# Patient Record
Sex: FEMALE | Race: WHITE
[De-identification: names, ages, dates, MRNs, and addresses within clinical notes are randomized per-mention and may not be internally consistent; named-entity substitution may affect disease eponyms.]

---

## 2018-03-30 ENCOUNTER — HOSPITAL ENCOUNTER (INPATIENT)
Dept: HOSPITAL 92 - ERS | Age: 58
LOS: 5 days | Discharge: HOME | DRG: 62 | End: 2018-04-04
Attending: INTERNAL MEDICINE | Admitting: INTERNAL MEDICINE
Payer: SELF-PAY

## 2018-03-30 VITALS — BODY MASS INDEX: 48.4 KG/M2

## 2018-03-30 LAB
ALBUMIN SERPL BCG-MCNC: 4.2 G/DL (ref 3.5–5)
ALP SERPL-CCNC: 84 U/L (ref 40–150)
ALT SERPL W P-5'-P-CCNC: 21 U/L (ref 8–55)
ANION GAP SERPL CALC-SCNC: 13 MMOL/L (ref 10–20)
APTT PPP: 27.8 SEC (ref 22.9–36.1)
AST SERPL-CCNC: 19 U/L (ref 5–34)
BASOPHILS # BLD AUTO: 0.1 THOU/UL (ref 0–0.2)
BASOPHILS NFR BLD AUTO: 1.5 % (ref 0–1)
BILIRUB SERPL-MCNC: 0.3 MG/DL (ref 0.2–1.2)
BUN SERPL-MCNC: 25 MG/DL (ref 9.8–20.1)
CALCIUM SERPL-MCNC: 9.8 MG/DL (ref 7.8–10.44)
CHLORIDE SERPL-SCNC: 100 MMOL/L (ref 98–107)
CK MB SERPL-MCNC: 3.4 NG/ML (ref 0–6.6)
CO2 SERPL-SCNC: 28 MMOL/L (ref 22–29)
CREAT CL PREDICTED SERPL C-G-VRATE: 0 ML/MIN (ref 70–130)
EOSINOPHIL # BLD AUTO: 0.2 THOU/UL (ref 0–0.7)
EOSINOPHIL NFR BLD AUTO: 3.2 % (ref 0–10)
GLOBULIN SER CALC-MCNC: 3.3 G/DL (ref 2.4–3.5)
GLUCOSE SERPL-MCNC: 154 MG/DL (ref 70–105)
HGB BLD-MCNC: 14 G/DL (ref 12–16)
INR PPP: 1
LYMPHOCYTES # BLD: 1.1 THOU/UL (ref 1.2–3.4)
LYMPHOCYTES NFR BLD AUTO: 21 % (ref 21–51)
MCH RBC QN AUTO: 27.7 PG (ref 27–31)
MCV RBC AUTO: 86.2 FL (ref 81–99)
MONOCYTES # BLD AUTO: 0.3 THOU/UL (ref 0.11–0.59)
MONOCYTES NFR BLD AUTO: 5.1 % (ref 0–10)
NEUTROPHILS # BLD AUTO: 3.6 THOU/UL (ref 1.4–6.5)
NEUTROPHILS NFR BLD AUTO: 69.2 % (ref 42–75)
PLATELET # BLD AUTO: 300 THOU/UL (ref 130–400)
POTASSIUM SERPL-SCNC: 4.5 MMOL/L (ref 3.5–5.1)
PROTHROMBIN TIME: 13.5 SEC (ref 12–14.7)
RBC # BLD AUTO: 5.07 MILL/UL (ref 4.2–5.4)
SODIUM SERPL-SCNC: 136 MMOL/L (ref 136–145)
SP GR UR STRIP: 1.03 (ref 1–1.04)
TROPONIN I SERPL DL<=0.01 NG/ML-MCNC: (no result) NG/ML (ref ?–0.03)
TROPONIN I SERPL DL<=0.01 NG/ML-MCNC: (no result) NG/ML (ref ?–0.03)
WBC # BLD AUTO: 5.1 THOU/UL (ref 4.8–10.8)

## 2018-03-30 PROCEDURE — 82553 CREATINE MB FRACTION: CPT

## 2018-03-30 PROCEDURE — 80061 LIPID PANEL: CPT

## 2018-03-30 PROCEDURE — 83036 HEMOGLOBIN GLYCOSYLATED A1C: CPT

## 2018-03-30 PROCEDURE — 93306 TTE W/DOPPLER COMPLETE: CPT

## 2018-03-30 PROCEDURE — 85730 THROMBOPLASTIN TIME PARTIAL: CPT

## 2018-03-30 PROCEDURE — 70450 CT HEAD/BRAIN W/O DYE: CPT

## 2018-03-30 PROCEDURE — 85610 PROTHROMBIN TIME: CPT

## 2018-03-30 PROCEDURE — 83735 ASSAY OF MAGNESIUM: CPT

## 2018-03-30 PROCEDURE — 36416 COLLJ CAPILLARY BLOOD SPEC: CPT

## 2018-03-30 PROCEDURE — 36415 COLL VENOUS BLD VENIPUNCTURE: CPT

## 2018-03-30 PROCEDURE — 0042T: CPT

## 2018-03-30 PROCEDURE — 3E03317 INTRODUCTION OF OTHER THROMBOLYTIC INTO PERIPHERAL VEIN, PERCUTANEOUS APPROACH: ICD-10-PCS | Performed by: EMERGENCY MEDICINE

## 2018-03-30 PROCEDURE — 70498 CT ANGIOGRAPHY NECK: CPT

## 2018-03-30 PROCEDURE — 93005 ELECTROCARDIOGRAM TRACING: CPT

## 2018-03-30 PROCEDURE — A4216 STERILE WATER/SALINE, 10 ML: HCPCS

## 2018-03-30 PROCEDURE — 85025 COMPLETE CBC W/AUTO DIFF WBC: CPT

## 2018-03-30 PROCEDURE — 81003 URINALYSIS AUTO W/O SCOPE: CPT

## 2018-03-30 PROCEDURE — 84484 ASSAY OF TROPONIN QUANT: CPT

## 2018-03-30 PROCEDURE — 80048 BASIC METABOLIC PNL TOTAL CA: CPT

## 2018-03-30 PROCEDURE — 93312 ECHO TRANSESOPHAGEAL: CPT

## 2018-03-30 PROCEDURE — 70496 CT ANGIOGRAPHY HEAD: CPT

## 2018-03-30 PROCEDURE — 94760 N-INVAS EAR/PLS OXIMETRY 1: CPT

## 2018-03-30 PROCEDURE — 80053 COMPREHEN METABOLIC PANEL: CPT

## 2018-03-30 NOTE — CT
CTA [place] OF [place] WITH 3D VOLUME RENDERING 

CTA NECK WITH 3D VOLUME RENDERING

CT BRAIN PERFUSION WITH CONTRAST:

 

Date:  03/30/18 

 

TECHNIQUE: 

CTA brain with contrast, CTA neck with contrast, and CT brain perfusion with contrast performed per s
[facility] protocol. 

 

CLINICAL HISTORY:  

Acute stroke, slurred speech. 

 

FINDINGS:

There is vascular calcification involving the imaged aortic arch. The great vessel origins that emana
te from the aortic arch are grossly patent. No significant stenosis of either visualized subclavian a
rtery. Bilateral common carotid arteries reveal no high grade stenosis or occlusion. There is a media
lly deviate retropharyngeal course of each carotid artery. There is calcified plaque at the origin of
 the cervical right ICA with mild associated luminal stenosis. Minimal calcification without associat
ed stenosis seen at the left carotid bulb. Otherwise, the cervical ICA are patent. There is calcifica
tion at each carotid terminus, involving intracranial course. Evaluation of [place] of [place] reveals 
no high grade stenosis or occlusion of the MCA, RONNY, or PCA bilaterally. No obvious abnormality of th
e anterior or posterior communicating arteries. No discrete intracranial aneurysm is seen. 

 

Evaluation of CT brain perfusion reveals an area of encephalomalacia in the right frontal lobe. There
 is no significant penumbra identified. 

 

Incidental note of prominent sized mediastinal lymph nodes, redemonstrated. 

 

IMPRESSION: 

1.  Mild scattered vascular disease. 

2.  Right frontal encephalomalacia. 

3.  No large penumbra. 

 

Notification of report results placed at 1038 hours on 03/30/18.

CODE CR. 

 

 

 

POS: [facility]

## 2018-03-30 NOTE — CT
CT HEAD NONCONTRAST:

 

Date:  03/30/18 

 

CLINICAL HISTORY:  

Stroke, slurred speech. 

 

FINDINGS:

There is no intracranial hemorrhage, mass effect, midline shift, or ventriculomegaly. There is eviden
ce to indicate mild microvascular ischemic disease, and a small remote right frontal lobe infarct. Th
ere is a nonspecific mild increased density seen within the right MCA. 

 

IMPRESSION: 

1.  No acute intracranial hemorrhage or mass effect. 

2.  Mild, relative increased density of the right MCA, nonspecific. 

 

Telephone call of stroke results placed to the provided ED telephone number, [phone] (KELLY So), in [facility] emergency department at 1008 hours on 03/30/18. 

 

CODE CR. 

 

 

POS: [facility]

## 2018-03-31 LAB
ANION GAP SERPL CALC-SCNC: 8 MMOL/L (ref 10–20)
APTT PPP: 28.2 SEC (ref 22.9–36.1)
BASOPHILS # BLD AUTO: 0.1 THOU/UL (ref 0–0.2)
BASOPHILS NFR BLD AUTO: 1.1 % (ref 0–1)
BUN SERPL-MCNC: 20 MG/DL (ref 9.8–20.1)
CALCIUM SERPL-MCNC: 9.5 MG/DL (ref 7.8–10.44)
CHD RISK SERPL-RTO: 4.6 (ref ?–4.5)
CHLORIDE SERPL-SCNC: 101 MMOL/L (ref 98–107)
CHOLEST SERPL-MCNC: 143 MG/DL
CO2 SERPL-SCNC: 32 MMOL/L (ref 22–29)
CREAT CL PREDICTED SERPL C-G-VRATE: 101 ML/MIN (ref 70–130)
EOSINOPHIL # BLD AUTO: 0.2 THOU/UL (ref 0–0.7)
EOSINOPHIL NFR BLD AUTO: 4.1 % (ref 0–10)
GLUCOSE SERPL-MCNC: 119 MG/DL (ref 70–105)
HDLC SERPL-MCNC: 31 MG/DL
HGB BLD-MCNC: 13 G/DL (ref 12–16)
INR PPP: 1.1
LDLC SERPL CALC-MCNC: 71 MG/DL
LYMPHOCYTES # BLD: 1.5 THOU/UL (ref 1.2–3.4)
LYMPHOCYTES NFR BLD AUTO: 27.5 % (ref 21–51)
MCH RBC QN AUTO: 28 PG (ref 27–31)
MCV RBC AUTO: 87.7 FL (ref 81–99)
MONOCYTES # BLD AUTO: 0.6 THOU/UL (ref 0.11–0.59)
MONOCYTES NFR BLD AUTO: 11 % (ref 0–10)
NEUTROPHILS # BLD AUTO: 3.1 THOU/UL (ref 1.4–6.5)
NEUTROPHILS NFR BLD AUTO: 56.3 % (ref 42–75)
PLATELET # BLD AUTO: 297 THOU/UL (ref 130–400)
POTASSIUM SERPL-SCNC: 4 MMOL/L (ref 3.5–5.1)
PROTHROMBIN TIME: 14.2 SEC (ref 12–14.7)
RBC # BLD AUTO: 4.64 MILL/UL (ref 4.2–5.4)
SODIUM SERPL-SCNC: 137 MMOL/L (ref 136–145)
TRIGL SERPL-MCNC: 207 MG/DL (ref ?–150)
WBC # BLD AUTO: 5.5 THOU/UL (ref 4.8–10.8)

## 2018-03-31 RX ADMIN — INSULIN HUMAN SCH UNIT: 100 INJECTION, SUSPENSION SUBCUTANEOUS at 20:31

## 2018-03-31 RX ADMIN — INSULIN HUMAN SCH UNIT: 100 INJECTION, SUSPENSION SUBCUTANEOUS at 07:52

## 2018-03-31 RX ADMIN — BISOPROLOL FUMARATE AND HYDROCHLOROTHIAZIDE SCH TAB: 6.25; 5 TABLET ORAL at 07:51

## 2018-03-31 NOTE — CT
PRELIMINARY REPORT/[facility]/EMERGENCY AFTER

HOURS PROCEDURE:

 

EXAM:

CT Head Without Intravenous Contrast

 

CLINICAL HISTORY:

58 years old, female; Signs and symptoms; Other: S/P tpa; Patient HX: F/u stroke; S/P tpa on 3/30, HX
 of stroke; Pt shows decreased facial droop of the left side; Pt. No longer has slurred speech.

 

TECHNIQUE:

Axial computed tomography images of the head/brain without intravenous contrast.

 

COMPARISON:

CT Brain WO Con 2018-03-30 10:02

 

FINDINGS:

Study is degraded by motion, streak and beam hardening artifacts.

Subtle small 5 mm hyperdensity in the inferior aspect of the robbie on the left seen in image 6.

New hypodensity in the medial aspect of the right putamen junction with the posterior limb of the int
ernal capsule.

No midline shift, mass, mass effect or hydrocephalus.

Gray-white matter differentiation is preserved.

Interval resolution of the previously seen mild increased right MCA density.

Orbits are unremarkable.

Opacification of the left frontal sinus and left anterior ethmoid sinus.

Mastoid air cells are clear.

No acute fracture.

Soft tissues unremarkable.

 

IMPRESSION:

1. Subtle small 5 mm hyperdensity in the inferior aspect of the robbie on the left seen in image 6. Thi
s is favored to represent artifact. However, recommend short interval followup imaging for further ev
aluation.

2. New hypodensity in the medial aspect of the right putamen junction with the posterior limb of the 
internal capsule consistent with acute infarct given the clinical history.

3. Interval resolution of the previously seen mild increased right MCA density.

 

THIS REPORT CONTAINS FINDINGS THAT MAY BE CRITICAL TO PATIENT CARE. The findings were verbally commun
icated via telephone conference with BERE KNUTSON at 5:25 AM CDT on 3/31/2018. The findings were ack
nowledged and understood.

 

Thank you for allowing us to participate in the care of your patient.

 

Dictated and Authenticated by: Yoel Soler MD

03/31/2018 5:27 AM Central Time ([place] & [place])

 

 

 

FINAL REPORT 

 

CT BRAIN WITHOUT CONTRAST:

 

HISTORY: 

Stroke.

 

COMPARISON: 

CT brain 3/30/18.

 

FINDINGS: 

There is hypodensity along the right centrum semiovale extending to the posterior limb right internal
 capsule.  This is new.  No acute hemorrhage.

 

IMPRESSION: 

Findings and impression are concordant with the preliminary report.

 

POS: [facility]

## 2018-03-31 NOTE — CON
DATE OF CONSULTATION:  2018

 

SERVICE:  Pulmonary Medicine.

 

REASON FOR CONSULTATION:  ICU patient.

 

HISTORY OF PRESENT ILLNESS:  The patient is a 58-year-old white female with 
past medical history significant for morbid obesity, type 2 diabetes mellitus 
and history of left-sided neurologic symptoms.  She presented to the hospital 
shortly after the onset of a left-sided weakness.  In the Emergency Department, 
she was administered TPA.  She was subsequently tucked into the ICU.  By the 
time she got here, her NIH scale was 3.  Overnight, she did not have any 
events.  She still indicates that she has a difficult time with her speech and 
that it feels funny.  Outside of this, her strength in the left lower extremity 
and left upper extremity has improved.

 

PAST MEDICAL HISTORY:

1.  Type 2 diabetes mellitus.

2.  Hypertension.

3.  Dyslipidemia.

4.  Morbid obesity.

5.  Neuropathy secondary to diabetes.

6.  Chronic low back pain.

7.  Obstructive sleep apnea.

8.  Anxiety disorder.

9.  Major depressive disorder.

 

PAST SURGICAL HISTORY:

1.   section x2.

2.  Breast biopsy.

3.  Bilateral shoulder surgeries.

 

SOCIAL HISTORY:  The patient is .  She denies any alcohol, tobacco or 
illicit drug use.  She has no exposure to chemicals, dusts, asbestos or 
tuberculosis.

 

FAMILY HISTORY:  Noncontributory.

 

ALLERGIES:  No known drug allergies.

 

MEDICATIONS:  List of her inpatient medications were reviewed.  Thera are no 
specific updates made at this time.

 

REVIEW OF SYSTEMS:  General, head, ears, eyes, nose, throat, cardiovascular, 
respiratory, GI, , musculoskeletal, neurologic and skin is negative except as 
mentioned in the HPI.

 

PHYSICAL EXAMINATION:

VITAL SIGNS:  Afebrile, blood pressure 154/67, respirations 17, saturation 99% 
on room air.

GENERAL:  The patient is awake, alert, no apparent distress.

LUNGS:  Decent air entry with no prolonged expiratory phase, wheezing, rhonchi, 
or crackles present.

HEART:  Normal rate, regular.

ABDOMEN:  Soft, nontender, nondistended.  Bowel sounds are positive.

MUSCULOSKELETAL:  No cyanosis or clubbing.  No pitting in the bilateral lower 
extremities.

NEUROLOGIC:  Grossly nonfocal.  She has left-sided facial droop, but I get the 
idea that this is functional.

 

LABORATORY DATA:  WBC 5.5, hemoglobin 13.0, platelets 297,000.  INR 1.1.  
Creatinine 1.2.  Basic metabolic profile and liver function studies are 
otherwise unremarkable.  Troponin 0.01.  Triglycerides are slightly elevated at 
207.  Hemoglobin A1c 7.1.  Urinalysis is unremarkable.

 

IMAGIN.  CT of the brain demonstrates a subtle small 5 mm hyperdensity in the 
inferior aspect of the robbie.  It is favored to be an artifact, however.  New 
hypodensity in the medial aspect of the right putamen junction with the 
posterior limb of the internal capsule, consistent with acute infarction.

2.  CTA of the head demonstrates mild scattered vascular disease.  Right 
frontal encephalomalacia.  No large penumbra is identified.

 

ASSESSMENT:

1.  Cerebrovascular accident, acute, status post TPA.

2.  Type 2 diabetes mellitus.

3.  Morbid obesity.

4.  Obstructive sleep apnea,

 

DISCUSSION AND PLAN:  The patient will continue using her CPAP equipment.  She 
is stable for transition out of the ICU to the Neuro unit as soon as 24 hours 
has passed since administration of the TPA.  I believe this will be scheduled 
for about at noon today.  When she goes to the floor, she had no further 
requirements for Pulmonary opinion and I will sign off.  Please call with 
additional questions or concerns moving forward.

 

70 minutes have been devoted to this patient in various activities.  I 
personally reviewed all imaging studies and laboratory data noted within this 
document.  For fifty percent of this time, I was interacting with the patient 
at the bedside or coordinating care with the care team.  For the remainder of 
the time I was immediately available to the patient in the hospital unit.  



MEENU

## 2018-03-31 NOTE — CON
DATE OF CONSULTATION:  2018

 

REFERRING PROVIDER:  Dr. Arsen Abernathy.

 

REASON FOR CONSULTATION:  Left-sided weakness.

 

HISTORY OF PRESENT ILLNESS:  Ms. Guevara is a pleasant 58-year-old  female who has been consul
[facility] for evaluation of left-sided weakness.  The patient reports that she has a history of stroke abou
t a year ago which resulted in left-sided weakness.  She had fully recovered from the stroke.  She wa
s independent.  She was able to walk without any assistance, had no problems with her speech or swall
owing or vision.  She reports that yesterday she had a sudden onset of left-sided weakness.  She had 
difficulty with talking.  She also noted left facial droop, she noted heaviness in the left upper and
 lower extremity and numbness in the left upper and lower extremity.  She immediately called EMS and 
she was brought to the [place] Emergency Room.  On arrival here, she had a CT head without contras
t and CT angiogram of the head and neck which were unremarkable.  She also had a CT perfusion scan do
ne which was unremarkable.  The ER physician had called regarding this patient to discuss the case re
garding IV TPA.  At that time given that she had no contraindication for IV TPA and her stroke scale 
was more than 4, I decided to administer the IV TPA to her.  Today, she reports of improvement in her
 strength of left upper and left lower extremity.  She continues to feel somewhat change in hearing o
r speech.  She also continues to complain of having left facial droop.  She denies any headache, ches
t pain, palpitation, nausea, vomiting and abdominal pain.

 

PAST MEDICAL HISTORY:  Significant for hypertension, diabetes, dyslipidemia, morbid obesity, neuropat
hy secondary to diabetes, obstructive sleep apnea, anxiety disorder, major depressive disorder and hi
story of stroke.

 

PAST SURGICAL HISTORY:  Significant for  x2, breast biopsy and bilateral shoulder surgeries.


 

SOCIAL HISTORY:  She is .  She denies smoking, alcohol use, or illicit drug use.

 

FAMILY HISTORY:  Noncontributory.

 

CURRENT MEDICATIONS:  Please review MAR.

 

ALLERGIES:  No known drug allergies.

 

PHYSICAL EXAMINATION:

VITAL SIGNS:  Blood pressure of 118/46, pulse of 60, temperature of 97.7, respirations of 20, and O2 
sats 94% on room air.

GENERAL:  Well-developed and well-nourished  female in no apparent distress.

RESPIRATORY:  Clear to auscultation bilaterally.

CARDIOVASCULAR:  Regular rate and rhythm.

NEUROLOGIC:  NIH stroke scale on my evaluation is noted to be 1.  Mental status:  The patient is awak
e, alert, oriented x3.  Speech and language:  Fluent speech, no dysarthria noted.  Cranial nerves:  P
upils are 3 mm and reactive.  Visual fields are intact.  External muscles are intact.  No nystagmus i
s noted.  There is a left facial droop noted.  Tongue and uvula are midline.  Motor exam showed [place]
l tone and bulk with 5/5 strength in both upper and lower extremities.  There is no pronator drift no
gustavo in both upper and lower extremities.  Sensory:  Sensation is intact and symmetric.  Deep tendon r
eflexes:  1+ reflexes in both upper and lower extremities.  Babinski:  Plantar responses flexion bila
terally.  Coordination intact to finger-nose-finger finger tapping bilaterally.

 

LABORATORY DATA:  Reviewed, which included CBC, CMP, lipid profile, urinalysis, coag panel, which is 
significant for hemoglobin A1c of 7.1 with glucose of 186, total cholesterol 143, ALT of 71, HDL of 3
1, and triglycerides of 207, otherwise unremarkable.

 

IMAGING STUDIES:  CT head without contrast was reviewed which showed no acute intracranial abnormalit
y.  Repeat CT head from today was reviewed which showed hypodensity along the right centrum semiovale
 extending into the right posterior limb of right internal capsule.  This is new, less suggestive of 
acute infarction.  CT angiogram of the head and neck and CT perfusion scan were reviewed.  This showe
d no acute intracranial or extracranial vascular abnormality.

 

IMPRESSION:

1.  Acute right middle cerebral artery distribution ischemic infarct.

2.  Left-sided weakness converted to #1.

3.  Hypertension.

4.  Diabetes.

 

ASSESSMENT AND PLAN:  Ms. Guevara is a pleasant 58-year-old  female with a history of hyperten
boy, diabetes, and dyslipidemia who presented with an acute onset of left-sided weakness.  She had m
et the criteria for IV TPA.  There was no contraindication noted.  Due to this reason, she had receiv
ed IV TPA.  Post-TPA, her symptoms seem to have improved.  Her repeat CT head done today did show an 
acute ischemic infarct involving the right centrum semiovale extending into the posterior limb of int
ernal capsule.  At this time, I will recommend switching her aspirin to Plavix 75 mg daily for second
[place] stroke prevention.  I have advised her that she needs to control her blood pressure, diabetes, an
d cholesterol and diet and exercise.  She will need PT, OT, and Speech Therapy evaluation.  She is ok
ay to be transferred to the Stroke Unit.  Continue supportive care.

 

Thank you for consultation.

## 2018-03-31 NOTE — PDOC.PN
- Subjective


Encounter Start Date: 03/31/18


Encounter Start Time: 14:00





Pt still with a little dysarthria, but LLE paresis resolved, LUE weakness 

resolved.  right facial droop resolved.





no f/c, no N/V/d/C, no CP or sOB,ernestina po solids and liquids without coughing.





10 point ROS performed and neg for all systems except as per HPI





- Objective


MAR Reviewed: Yes


Vital Signs & Weight: 


 Vital Signs (12 hours)











  Temp Pulse Pulse BP BP Pulse Ox Pulse Ox


 


 03/31/18 20:00  98 F      


 


 03/31/18 16:00  97.7 F      


 


 03/31/18 12:00  97.7 F      


 


 03/31/18 09:20   61  69  137/69  159/67 H  95  95








 Weight











Weight                         268 lb 11.896 oz











 Most Recent Monitor Data











Heart Rate from ECG            70


 


NIBP                           151/53


 


NIBP BP-Mean                   90


 


Respiration from ECG           20


 


SpO2                           95














I&O: 


 











 03/30/18 03/31/18 04/01/18





 06:59 06:59 06:59


 


Intake Total  1220 1370


 


Output Total  2300 2100


 


Balance  -1080 -730











Result Diagrams: 


 03/31/18 04:30





 03/31/18 04:30


Additional Labs: 


 Accuchecks











  03/31/18 03/31/18 03/30/18





  17:40 11:07 21:20


 


POC Glucose  130 H  102  186 H











Radiology Reviewed by me: Yes


EKG Reviewed by me: Yes





Phys Exam





- Physical Examination


Constitutional: NAD


HEENT: PERRLA, moist MMs, sclera anicteric, oral pharynx no lesions


Neck: no nodes, no JVD, supple, full ROM


Respiratory: no wheezing, no rales, no rhonchi, clear to auscultation bilateral


Cardiovascular: RRR, no significant murmur, no rub


Gastrointestinal: soft, non-tender, no distention, positive bowel sounds


Musculoskeletal: no edema, pulses present


Neurological: non-focal, normal sensation, moves all 4 limbs


Lymphatic: no nodes


Psychiatric: normal affect, A&O x 3


Skin: no rash, normal turgor, cap refill <2 seconds





Dx/Plan


(1) Acute kidney injury


Code(s): N17.9 - ACUTE KIDNEY FAILURE, UNSPECIFIED   Status: Acute   





(2) CVA (cerebral vascular accident)


Code(s): I63.9 - CEREBRAL INFARCTION, UNSPECIFIED   Status: Acute   


Qualifiers: 


   CVA mechanism: thrombosis   Precerebral and cerebral artery: middle cerebral 

artery   Laterality of affected vessel: right   Qualified Code(s): I63.311 - 

Cerebral infarction due to thrombosis of right middle cerebral artery   





(3) Diabetes type 2, controlled


Code(s): E11.9 - TYPE 2 DIABETES MELLITUS WITHOUT COMPLICATIONS   Status: 

Chronic   


Qualifiers: 


   Diabetes mellitus long term insulin use: with long term use   Diabetes 

mellitus complication detail: with polyneuropathy 





(4) Diabetic neuropathy


Code(s): E11.40 - TYPE 2 DIABETES MELLITUS WITH DIABETIC NEUROPATHY, UNSP   

Status: Chronic   


Qualifiers: 


   Diabetes mellitus type: type 2   Diabetes mellitus complication detail: 

diabetic polyneuropathy   Qualified Code(s): E11.42 - Type 2 diabetes mellitus 

with diabetic polyneuropathy   





(5) Dyslipidemia


Code(s): E78.5 - HYPERLIPIDEMIA, UNSPECIFIED   Status: Chronic   





(6) Hypertension


Code(s): I10 - ESSENTIAL (PRIMARY) HYPERTENSION   Status: Chronic   


Qualifiers: 


   Hypertension type: essential hypertension   Qualified Code(s): I10 - 

Essential (primary) hypertension   





(7) Hypothyroidism


Code(s): E03.9 - HYPOTHYROIDISM, UNSPECIFIED   Status: Chronic   


Qualifiers: 


   Hypothyroidism type: acquired   Qualified Code(s): E03.9 - Hypothyroidism, 

unspecified   





(8) Morbid obesity with BMI of 45.0-49.9, adult


Code(s): E66.01 - MORBID (SEVERE) OBESITY DUE TO EXCESS CALORIES; Z68.42 - BODY 

MASS INDEX (BMI) 45.0-49.9, ADULT   Status: Chronic   





(9) KEVIN (obstructive sleep apnea)


Code(s): G47.33 - OBSTRUCTIVE SLEEP APNEA (ADULT) (PEDIATRIC)   Status: Chronic

   





- Plan


cont current plan of care, PT/OT, speech therapy, out of bed/ambulate





* .


hold ASA and heparin, CT with 'new' hyperdnese pontine lesion.  Will ask NSG to 

see, repeat CT ordered for the AM





OK to transfer to stroke unit, orders written

## 2018-04-01 RX ADMIN — INSULIN HUMAN SCH UNIT: 100 INJECTION, SUSPENSION SUBCUTANEOUS at 21:23

## 2018-04-01 RX ADMIN — INSULIN HUMAN SCH UNIT: 100 INJECTION, SUSPENSION SUBCUTANEOUS at 08:45

## 2018-04-01 RX ADMIN — INSULIN HUMAN SCH: 100 INJECTION, SUSPENSION SUBCUTANEOUS at 21:32

## 2018-04-01 RX ADMIN — BISOPROLOL FUMARATE AND HYDROCHLOROTHIAZIDE SCH TAB: 6.25; 5 TABLET ORAL at 08:43

## 2018-04-01 NOTE — CT
PRELIMINARY REPORT/[facility]/EMERGENTY AFTER-HOURS PROCEDURE 

 

EXAM:

CT Head Without Intravenous Contrast

 

CLINICAL HISTORY:

58 years old, female; Signs and symptoms; Weakness, facial; Patient HX: F/u stroke on 03/30/2018; Pt.
 Shows decreased facial droop of the left side and no longer has slurred speech. Tpa given on 03/30. 
F/u possible pontine bleed.

 

TECHNIQUE:

Axial computed tomography images of the head/brain without intravenous contrast.

 

COMPARISON:

CT Brain WO Con 2018-03-31 04:49

 

FINDINGS:

Mild cerebral volume loss.

Chronic small vessel disease.

Unchanged small hypodensity in the posterior limb of the internal capsule on the right.

No intracranial hemorrhage or hydrocephalus.

No mass, mass effect or midline shift.

No effacement of the ventricles, cortical sulci and basal cisterns.

Gray-white matter differentiation is preserved.

Atherosclerosis of the intracranial vasculature.

No dense MCA sign.

Orbits are unremarkable.

Ossification of the left frontal and left anterior ethmoid sinus.

Mastoid air cells are clear.

No acute fracture.

Soft tissues unremarkable.

 

IMPRESSION:

1. No intracranial hemorrhage.

2. Unchanged small hypodensity in the posterior limb of the internal capsule on the right consistent

with recent infarct.

 

Thank you for allowing us to participate in the care of your patient.

Dictated and Authenticated by: Yoel Soler MD

04/01/2018 6:08 AM Central Time ([place] & [place])

 

 

 

FINAL REPORT 

 

CT BRAIN WITHOUT CONTRAST:

 

HISTORY: 

Followup pontine hemorrhage.

 

COMPARISON: 

CT brain prior day.

 

FINDINGS: 

Findings and impression are concordant with the preliminary report.

 

POS: [facility]

## 2018-04-01 NOTE — PRG
DATE OF SERVICE:  04/01/2018

 

SERVICE:  Pulmonary Medicine.

 

INTERVAL HISTORY:  The patient is doing fantastic from a respiratory perspective.  She denies any mark
st pain, nausea, vomiting or shortness of breath.  She went down for repeat CT scan of the head.  Oth
erwise, there has been no interval change to her condition.

 

PHYSICAL EXAMINATION:

VITAL SIGNS:  Afebrile, pulse 66, blood pressure 148/67, respirations 58, saturation 95% on room air.


GENERAL:  The patient is awake and alert, in no apparent distress.

HEENT:  Normocephalic, atraumatic.  Sclerae are white, conjunctivae pink.  Oral and nasal mucosa is m
oist without lesions.  There is slight facial asymmetry.

LUNGS:  Decent air entry with no prolonged expiratory phase.

HEART:  Normal rate, regular.

ABDOMEN:  Soft, nontender, nondistended.  Bowel sounds are positive.

MUSCULOSKELETAL:  No cyanosis or clubbing.  No pitting in the bilateral lower extremities.

NEUROLOGIC:  Slight facial asymmetry.  Outside of this, she is nonfocal.

 

LABORATORY DATA AND IMAGING:  Blood sugars ranged from .  Urinalysis is unremarkable.  The conor
ent remains stable for transition out of the ICU to the stroke unit.  No interval change on CT scan t
salv[place].

 

ASSESSMENT:

1.  Cerebrovascular accident, status post TPA.

2.  Type 2 diabetes mellitus.

3.  Morbid obesity.

4.  Obstructive sleep apnea.

 

DISCUSSION AND PLAN:  The patient is doing fantastic neurologically.  As such, she can be transitione
d to the stroke unit.  Pulmonary and Critical Care will sign off when she lands on the floor.  Please
 call with additional questions or concerns moving forward.

## 2018-04-01 NOTE — PDOC.PN
- Subjective


Encounter Start Date: 04/01/18


Encounter Start Time: 10:45





Pt stable.  still with mild dysarthria, but no problems with eating or 

drinking.  walking with PT well,  left arm and leg and right face back to 

normal.





Seen by Neuro, recommended changing ASAto Plavix.  Changes made.  Awaiting bed 

on the floor.





Echo with good function, but MV with poor images, recommended GORDON.  Will 

consult cardiology in AM and keep NPO after MN





10 point ROS performed and neg for all systems except as per HPI





- Objective


MAR Reviewed: Yes


Vital Signs & Weight: 


 Vital Signs (12 hours)











  Temp Pulse Pulse Pulse Resp BP BP


 


 04/01/18 12:00  97.4 F L      


 


 04/01/18 10:44    98  98    170/78 H


 


 04/01/18 08:42   66     148/67 H 


 


 04/01/18 08:00  98.5 F  66    20  


 


 04/01/18 07:00  98.5 F      


 


 04/01/18 04:00  97.9 F      














  BP Pulse Ox


 


 04/01/18 12:00  


 


 04/01/18 10:44  158/88 H 


 


 04/01/18 08:42  


 


 04/01/18 08:00   95


 


 04/01/18 07:00  


 


 04/01/18 04:00  








 Weight











Weight                         268 lb 11.896 oz











 Most Recent Monitor Data











Heart Rate from ECG            62


 


NIBP                           153/81


 


NIBP BP-Mean                   97


 


Respiration from ECG           14


 


SpO2                           88














I&O: 


 











 03/31/18 04/01/18 04/02/18





 06:59 06:59 06:59


 


Intake Total 1220 4050 600


 


Output Total 2300 2600 875


 


Balance -1080 1450 -275











Result Diagrams: 


 03/31/18 04:30





 03/31/18 04:30


Additional Labs: 


 Accuchecks











  04/01/18 04/01/18 04/01/18





  11:11 04:57 02:14


 


POC Glucose  132 H  117 H  86














  04/01/18 03/31/18 03/31/18





  01:38 20:38 17:40


 


POC Glucose  57 L*  177 H  130 H











Radiology Reviewed by me: Yes


EKG Reviewed by me: Yes





Phys Exam





- Physical Examination


Constitutional: NAD


HEENT: PERRLA, moist MMs, sclera anicteric, oral pharynx no lesions


Neck: no nodes, no JVD, supple, full ROM


Respiratory: no wheezing, no rales, no rhonchi, clear to auscultation bilateral


Cardiovascular: RRR, no significant murmur, no rub


Gastrointestinal: soft, non-tender, no distention, positive bowel sounds


Musculoskeletal: no edema, pulses present


Neurological: non-focal, normal sensation, moves all 4 limbs


Lymphatic: no nodes


Psychiatric: normal affect, A&O x 3


Skin: no rash, normal turgor, cap refill <2 seconds





Dx/Plan


(1) Acute kidney injury


Code(s): N17.9 - ACUTE KIDNEY FAILURE, UNSPECIFIED   Status: Acute   





(2) CVA (cerebral vascular accident)


Code(s): I63.9 - CEREBRAL INFARCTION, UNSPECIFIED   Status: Acute   


Qualifiers: 


   CVA mechanism: thrombosis   Precerebral and cerebral artery: middle cerebral 

artery   Laterality of affected vessel: right   Qualified Code(s): I63.311 - 

Cerebral infarction due to thrombosis of right middle cerebral artery   


Comment: residual dysarthria, small putamen infarct.  Plavix   





(3) Diabetes type 2, controlled


Code(s): E11.9 - TYPE 2 DIABETES MELLITUS WITHOUT COMPLICATIONS   Status: 

Chronic   


Qualifiers: 


   Diabetes mellitus long term insulin use: with long term use   Diabetes 

mellitus complication detail: with polyneuropathy 





(4) Diabetic neuropathy


Code(s): E11.40 - TYPE 2 DIABETES MELLITUS WITH DIABETIC NEUROPATHY, UNSP   

Status: Chronic   


Qualifiers: 


   Diabetes mellitus type: type 2   Diabetes mellitus complication detail: 

diabetic polyneuropathy   Qualified Code(s): E11.42 - Type 2 diabetes mellitus 

with diabetic polyneuropathy   





(5) Dyslipidemia


Code(s): E78.5 - HYPERLIPIDEMIA, UNSPECIFIED   Status: Chronic   





(6) Hypertension


Code(s): I10 - ESSENTIAL (PRIMARY) HYPERTENSION   Status: Chronic   


Qualifiers: 


   Hypertension type: essential hypertension   Qualified Code(s): I10 - 

Essential (primary) hypertension   





(7) Hypothyroidism


Code(s): E03.9 - HYPOTHYROIDISM, UNSPECIFIED   Status: Chronic   


Qualifiers: 


   Hypothyroidism type: acquired   Qualified Code(s): E03.9 - Hypothyroidism, 

unspecified   





(8) Morbid obesity with BMI of 45.0-49.9, adult


Code(s): E66.01 - MORBID (SEVERE) OBESITY DUE TO EXCESS CALORIES; Z68.42 - BODY 

MASS INDEX (BMI) 45.0-49.9, ADULT   Status: Chronic   





(9) KEVIN (obstructive sleep apnea)


Code(s): G47.33 - OBSTRUCTIVE SLEEP APNEA (ADULT) (PEDIATRIC)   Status: Chronic

   





- Plan


cont current plan of care, plan discussed w/ family, PT/OT, out of bed/ambulate





* .


GORDON in AM

## 2018-04-02 LAB
ANION GAP SERPL CALC-SCNC: 13 MMOL/L (ref 10–20)
BASOPHILS # BLD AUTO: 0.1 THOU/UL (ref 0–0.2)
BASOPHILS NFR BLD AUTO: 1.1 % (ref 0–1)
BUN SERPL-MCNC: 22 MG/DL (ref 9.8–20.1)
CALCIUM SERPL-MCNC: 9.8 MG/DL (ref 7.8–10.44)
CHLORIDE SERPL-SCNC: 101 MMOL/L (ref 98–107)
CO2 SERPL-SCNC: 25 MMOL/L (ref 22–29)
CREAT CL PREDICTED SERPL C-G-VRATE: 98 ML/MIN (ref 70–130)
EOSINOPHIL # BLD AUTO: 0.3 THOU/UL (ref 0–0.7)
EOSINOPHIL NFR BLD AUTO: 4.8 % (ref 0–10)
GLUCOSE SERPL-MCNC: 154 MG/DL (ref 70–105)
HGB BLD-MCNC: 13.6 G/DL (ref 12–16)
LYMPHOCYTES # BLD: 1.7 THOU/UL (ref 1.2–3.4)
LYMPHOCYTES NFR BLD AUTO: 25.9 % (ref 21–51)
MAGNESIUM SERPL-MCNC: 1.6 MG/DL (ref 1.6–2.6)
MCH RBC QN AUTO: 27.9 PG (ref 27–31)
MCV RBC AUTO: 87.3 FL (ref 81–99)
MONOCYTES # BLD AUTO: 0.6 THOU/UL (ref 0.11–0.59)
MONOCYTES NFR BLD AUTO: 9.2 % (ref 0–10)
NEUTROPHILS # BLD AUTO: 3.8 THOU/UL (ref 1.4–6.5)
NEUTROPHILS NFR BLD AUTO: 59 % (ref 42–75)
PLATELET # BLD AUTO: 327 THOU/UL (ref 130–400)
POTASSIUM SERPL-SCNC: 4.4 MMOL/L (ref 3.5–5.1)
RBC # BLD AUTO: 4.89 MILL/UL (ref 4.2–5.4)
SODIUM SERPL-SCNC: 135 MMOL/L (ref 136–145)
WBC # BLD AUTO: 6.4 THOU/UL (ref 4.8–10.8)

## 2018-04-02 RX ADMIN — Medication SCH ML: at 22:14

## 2018-04-02 RX ADMIN — INSULIN HUMAN SCH UNIT: 100 INJECTION, SUSPENSION SUBCUTANEOUS at 09:02

## 2018-04-02 RX ADMIN — INSULIN HUMAN SCH: 100 INJECTION, SUSPENSION SUBCUTANEOUS at 22:09

## 2018-04-02 RX ADMIN — BISOPROLOL FUMARATE AND HYDROCHLOROTHIAZIDE SCH TAB: 6.25; 5 TABLET ORAL at 08:47

## 2018-04-02 NOTE — PDOC.PN
- Subjective


Encounter Start Date: 04/02/18


Encounter Start Time: 10:40


Subjective: Speech still a little slurred. No other symptoms. Doing well.





- Objective


MAR Reviewed: Yes


Vital Signs & Weight: 


 Vital Signs (12 hours)











  Temp Pulse Resp BP Pulse Ox


 


 04/02/18 08:47   67   


 


 04/02/18 07:38  97.6 F  67  16  150/76 H  91 L


 


 04/02/18 05:45  98.4 F  60  16  147/73 H  92 L


 


 04/02/18 04:05  98.4 F  64  16  157/86 H  94 L


 


 04/02/18 00:00  98.6 F  63  20  142/62 H  95








 Weight











Weight                         263 lb 3.2 oz











 Most Recent Monitor Data











Heart Rate from ECG            66


 


NIBP                           168/86


 


NIBP BP-Mean                   104


 


Respiration from ECG           12


 


SpO2                           88














I&O: 


 











 04/01/18 04/02/18 04/03/18





 06:59 06:59 06:59


 


Intake Total 4050 2150 


 


Output Total 2600 875 


 


Balance 1450 1275 











Result Diagrams: 


 04/02/18 04:52





 04/02/18 04:52


Additional Labs: 


 Accuchecks











  04/02/18 04/01/18 04/01/18





  05:49 21:02 17:38


 


POC Glucose  152 H  206 H  109














  04/01/18





  11:11


 


POC Glucose  132 H














Phys Exam





- Physical Examination


Constitutional: NAD


HEENT: moist MMs


Respiratory: no wheezing, no rales, no rhonchi, clear to auscultation bilateral


Cardiovascular: RRR, no significant murmur


Gastrointestinal: soft, positive bowel sounds


Musculoskeletal: no edema


Neurological: non-focal, moves all 4 limbs


Psychiatric: normal affect, A&O x 3





Dx/Plan


(1) CVA (cerebral vascular accident)


Code(s): I63.9 - CEREBRAL INFARCTION, UNSPECIFIED   Status: Acute   


Qualifiers: 


   CVA mechanism: thrombosis   Precerebral and cerebral artery: middle cerebral 

artery   Laterality of affected vessel: right   Qualified Code(s): I63.311 - 

Cerebral infarction due to thrombosis of right middle cerebral artery   


Comment: residual dysarthria, small putamen infarct, s/p TPA, start Plavix when 

recommended by Neuro.   





(2) Acute kidney injury


Code(s): N17.9 - ACUTE KIDNEY FAILURE, UNSPECIFIED   Status: Acute   Comment: 

stable   





(3) Chronic low back pain


Code(s): M54.5 - LOW BACK PAIN; G89.29 - OTHER CHRONIC PAIN   Status: Chronic   





(4) Diabetes type 2, controlled


Code(s): E11.9 - TYPE 2 DIABETES MELLITUS WITHOUT COMPLICATIONS   Status: 

Chronic   


Qualifiers: 


   Diabetes mellitus long term insulin use: with long term use   Diabetes 

mellitus complication detail: with polyneuropathy 





(5) Diabetic neuropathy


Code(s): E11.40 - TYPE 2 DIABETES MELLITUS WITH DIABETIC NEUROPATHY, UNSP   

Status: Chronic   


Qualifiers: 


   Diabetes mellitus type: type 2   Diabetes mellitus complication detail: 

diabetic polyneuropathy   Qualified Code(s): E11.42 - Type 2 diabetes mellitus 

with diabetic polyneuropathy   





(6) Dyslipidemia


Code(s): E78.5 - HYPERLIPIDEMIA, UNSPECIFIED   Status: Chronic   





(7) Hypertension


Code(s): I10 - ESSENTIAL (PRIMARY) HYPERTENSION   Status: Chronic   


Qualifiers: 


   Hypertension type: essential hypertension   Qualified Code(s): I10 - 

Essential (primary) hypertension   





(8) Hypothyroidism


Code(s): E03.9 - HYPOTHYROIDISM, UNSPECIFIED   Status: Chronic   


Qualifiers: 


   Hypothyroidism type: acquired   Qualified Code(s): E03.9 - Hypothyroidism, 

unspecified   





(9) Morbid obesity with BMI of 45.0-49.9, adult


Code(s): E66.01 - MORBID (SEVERE) OBESITY DUE TO EXCESS CALORIES; Z68.42 - BODY 

MASS INDEX (BMI) 45.0-49.9, ADULT   Status: Chronic   





(10) KEVIN (obstructive sleep apnea)


Code(s): G47.33 - OBSTRUCTIVE SLEEP APNEA (ADULT) (PEDIATRIC)   Status: Chronic

   





- Plan


cont current plan of care, PT/OT, out of bed/ambulate, DVT proph w/SCDs


Plan for GORDON by cardiology today or tomorrow.





* .








- Discharge Day


Encounter end time: 11:00

## 2018-04-02 NOTE — HP
DATE OF ADMISSION:  2018

 

PRIMARY CARE PHYSICIAN:  [facility].

 

CHIEF COMPLAINT:  Slurred speech.

 

HISTORY OF PRESENT ILLNESS:  Ms. Guevara is a 58-year-old female with history of severe obesity, diabet
es, hypothyroidism, hypertension, obstructive sleep apnea, stroke secondary to cerebrovascular diseas
e back in 2017 and depression, who presents to the emergency department.

 

She developed difficulty forming speech and felt off balance when she woke up at about 0800 on the da
y of admission.  These symptoms were present when wake up.  She was walking around her house and noti
earlene how difficult it was to get around and so her  brought her to the emergency department for
 evaluation.

 

In the emergency department, she had a clear right-sided facial droop and weakness to the left upper 
and left lower extremity that was coming and going.  They contacted Dr. Brittni Sy, who agreed with T
PA and the patient was given intravenous tissue plasminogen activator.  We were subsequently called a
fter administration for admission to the ICU under TPA protocol.

 

The patient was seen and examined.  She was forming speech and was able to move her left leg markedly
 improved on arrival to the ICU.  She had improved strength through her left upper extremity.

 

She denies any fevers or chills.  No nausea or vomiting.  No diarrhea or constipation.  No difficulty
 swallowing or choking.

 

PAST MEDICAL HISTORY:

1.  Diabetes mellitus type 2.

2.  Hypothyroidism.

3.  Hypertension.

4.  Obstructive sleep apnea, on an auto CPAP at 3.5 cm of water.

5.  Ovarian cyst.

6.  History of stroke in 2017.

7.  Depression.

 

PAST SURGICAL HISTORY:

1.   x2.

2.  Breast biopsy.

3.  Bilateral shoulder repair.

 

HOME MEDICATIONS:

1.  Amlodipine 20 mg p.o. daily.

2.  Aspirin 81 mg daily.

3.  Bisoprolol/hydrochlorothiazide 5/6.25 two tablets p.o. daily.

4.  Fluoxetine 60 mg p.o. daily.

5.  Neurontin 600 mg p.o. b.i.d.

6.  NovoLog 70/30 of 43 units subcu b.i.d.

7.  Levothyroxine 137 mcg daily.

8.  Losartan 100 mg daily.

9.  Metformin 1000 mg p.o. b.i.d.

10.  Naproxen 220 mg p.o. b.i.d. p.r.n.

 

ALLERGIES:  NKDA.

 

FAMILY HISTORY:  Negative history of clotting or bleeding disorder, no immune dysfunction.  No signif
icant cerebrovascular disease.

 

SOCIAL HISTORY:  Negative for habits x3.  She is .  Her  does accompany her.  No recent
 travel.

 

REVIEW OF SYSTEMS:  A 10-point review of systems was performed and was negative for all systems excep
t stated as per HPI.

 

PHYSICAL EXAMINATION:

VITAL SIGNS:  Temperature is 98.3, pulse 57, blood pressure 148/65, respiratory rate 21, satting 100%
 on room air.

GENERAL:  She is awake.  She is alert.  She is oriented x3.  She is a pleasant, severely obese white 
female, appears to be in no acute distress.  Her speech is a concise and mostly clear.  She has occas
ionally garbled sound, but has not having trouble finding words and has a normal speech pattern.

HEENT:  Normocephalic, atraumatic.  Pupils are equal and reactive bilaterally.  Mucous membranes are 
moist.  She has no visible lesions.  No thrush.  Face is symmetric.

NECK:  Supple.  She has no lymphadenopathy, JVD or thyromegaly.  She has normal carotid upstrokes.  T
here are no bruits.

LUNGS:  Clear.  No wheezes, no rales, no rhonchi.

CARDIOVASCULAR:  She is slightly bradycardic, but regular.  Normal S1, S2.  She has a faint diastolic
 rumble present over the apex.  I do not hear a holosystolic murmur.

ABDOMEN:  Obese.  It is nontender, nondistended.  I cannot palpate internal organs.

EXTREMITIES:  Show no cyanosis, no clubbing, and no edema.  She has 2+ peripheral pulses in the dorsa
lis pedis, posterior tibial and radial arteries bilaterally.

SKIN:  Warm, moist, and well perfused.  She has no rashes or lesions.

NEUROLOGIC:  Cranial nerves II-XII are grossly intact.  She has no facial asymmetry.  She does look l
helio she has a little bit of left lower lip droop, but does have a symmetric smile.  She has no asymme
try to her forehead.  She has normal speech pattern, 5/5 strength in all 4 of her extremities.  She h
as no coordination issues.  I did not get her a walker.

MUSCULOSKELETAL:  Normal to inspection.  Large joints appear uninflamed and normal to inspection.  Sh
e has no palpable effusions.

 

LABORATORY DATA:  Sodium is 136, potassium 4.5, chloride 100, bicarbonate 28, BUN 28, creatinine 1.26
, glucose 154 and calcium 9.8.

 

Liver function is completely within normal limits.

 

CBC showed a white count of 5.1, hemoglobin 14.0, hematocrit of 43.7, platelet count is 306,000.

 

CK-MB was normal at 3.4, troponin I was less than 0.010.  Urinalysis was negative.  INR is 1.0.

 

Last echo on 04/15/2017 showed EF of 60% to 65%.  She had moderate to severe left atrial enlargement 
and moderate to severe mitral stenosis, possibly rheumatic mitral valve.

 

RADIOGRAPHIC STUDIES:  She had a CT scan of the brain that showed right MCA microvascular disease.  N
o evidence of intracranial hemorrhage or mass effect.  CT angiogram with perfusion scan showed mild s
cattered vascular disease, right frontal encephalomalacia and no large penumbra.

 

ASSESSMENT AND PLAN:

1.  Cerebrovascular accident.  The patient got TPA, but on arrival to the ICU, was almost completely 
resolved.  She continues to have a small amount of dysarthria.  On the TPA protocol, we will hold all
 antiplatelet agents.  We will get a repeat CT scan in the morning.  We will continue neuro checks an
d ask Neurology to evaluate.  PT, OT and ST has been consulted.  We will not make her n.p.o. as she i
s not having trouble.  We will allow her to eat overnight.  If she developed any new problems, then w
e will alter the plan obviously.

2.  Diabetes mellitus type 2.  Since she is eating, we will continue regular medications with sliding
 scale insulin added.

3.  Hypothyroidism, on levothyroxine.  We will continue.

4.  Hypertension.  We will continue bisoprolol and hydrochlorothiazide, continue her losartan, and co
ntinue her amlodipine.

5.  History of cerebrovascular disease.  She is already on aspirin daily.  We will increase to 325 wh
en she is able to take antiplatelet agents.  We will follow up on Neurology's recommendations.

6.  Depression, on no equivalent therapy.

## 2018-04-03 PROCEDURE — B24BZZ4 ULTRASONOGRAPHY OF HEART WITH AORTA, TRANSESOPHAGEAL: ICD-10-PCS | Performed by: INTERNAL MEDICINE

## 2018-04-03 RX ADMIN — INSULIN HUMAN SCH UNIT: 100 INJECTION, SUSPENSION SUBCUTANEOUS at 21:19

## 2018-04-03 RX ADMIN — Medication SCH ML: at 21:22

## 2018-04-03 RX ADMIN — Medication SCH: at 09:12

## 2018-04-03 RX ADMIN — BISOPROLOL FUMARATE AND HYDROCHLOROTHIAZIDE SCH TAB: 6.25; 5 TABLET ORAL at 05:48

## 2018-04-03 RX ADMIN — INSULIN HUMAN SCH: 100 INJECTION, SUSPENSION SUBCUTANEOUS at 09:12

## 2018-04-03 NOTE — PDOC.PN
- Subjective


Encounter Start Date: 04/03/18


Encounter Start Time: 11:15


Subjective: Patient feels like the slurred speech is a bit better. Awaiting


-: GORDON.





- Objective


MAR Reviewed: Yes


Vital Signs & Weight: 


 Vital Signs (12 hours)











  Temp Pulse Resp BP Pulse Ox


 


 04/03/18 07:30  97.7 F  57 L  16  146/77 H  97


 


 04/03/18 03:50  97.6 F  58 L  16  142/78 H  94 L


 


 04/03/18 00:04  96.6 F L  57 L  16  146/90 H  93 L








 Weight











Weight                         263 lb 4.8 oz











 Most Recent Monitor Data











Heart Rate from ECG            66


 


NIBP                           168/86


 


NIBP BP-Mean                   104


 


Respiration from ECG           12


 


SpO2                           88














I&O: 


 











 04/02/18 04/03/18 04/04/18





 06:59 06:59 06:59


 


Intake Total 2150 1320 


 


Output Total 875  


 


Balance 1275 1320 











Result Diagrams: 


 04/02/18 04:52





 04/02/18 04:52


Additional Labs: 


 Accuchecks











  04/03/18 04/02/18 04/02/18





  06:15 20:53 17:21


 


POC Glucose  118 H  180 H  178 H














  04/02/18





  10:45


 


POC Glucose  219 H














Phys Exam





- Physical Examination


Constitutional: NAD


HEENT: moist MMs


Respiratory: no wheezing, no rales, no rhonchi, clear to auscultation bilateral


Cardiovascular: RRR, no significant murmur


Gastrointestinal: soft, positive bowel sounds


Musculoskeletal: no edema


Neurological: non-focal, moves all 4 limbs


Psychiatric: normal affect, A&O x 3





Dx/Plan


(1) CVA (cerebral vascular accident)


Code(s): I63.9 - CEREBRAL INFARCTION, UNSPECIFIED   Status: Acute   


Qualifiers: 


   CVA mechanism: thrombosis   Precerebral and cerebral artery: middle cerebral 

artery   Laterality of affected vessel: right   Qualified Code(s): I63.311 - 

Cerebral infarction due to thrombosis of right middle cerebral artery   


Comment: residual dysarthria, small putamen infarct, s/p TPA, Plavix started, 

will try to get for patient outpatient   





(2) Acute kidney injury


Code(s): N17.9 - ACUTE KIDNEY FAILURE, UNSPECIFIED   Status: Acute   Comment: 

stable   





(3) Chronic low back pain


Code(s): M54.5 - LOW BACK PAIN; G89.29 - OTHER CHRONIC PAIN   Status: Chronic   





(4) Diabetes type 2, controlled


Code(s): E11.9 - TYPE 2 DIABETES MELLITUS WITHOUT COMPLICATIONS   Status: 

Chronic   


Qualifiers: 


   Diabetes mellitus long term insulin use: with long term use   Diabetes 

mellitus complication detail: with polyneuropathy 





(5) Diabetic neuropathy


Code(s): E11.40 - TYPE 2 DIABETES MELLITUS WITH DIABETIC NEUROPATHY, UNSP   

Status: Chronic   


Qualifiers: 


   Diabetes mellitus type: type 2   Diabetes mellitus complication detail: 

diabetic polyneuropathy   Qualified Code(s): E11.42 - Type 2 diabetes mellitus 

with diabetic polyneuropathy   





(6) Dyslipidemia


Code(s): E78.5 - HYPERLIPIDEMIA, UNSPECIFIED   Status: Chronic   





(7) Hypertension


Code(s): I10 - ESSENTIAL (PRIMARY) HYPERTENSION   Status: Chronic   


Qualifiers: 


   Hypertension type: essential hypertension   Qualified Code(s): I10 - 

Essential (primary) hypertension   





(8) Hypothyroidism


Code(s): E03.9 - HYPOTHYROIDISM, UNSPECIFIED   Status: Chronic   


Qualifiers: 


   Hypothyroidism type: acquired   Qualified Code(s): E03.9 - Hypothyroidism, 

unspecified   





(9) Morbid obesity with BMI of 45.0-49.9, adult


Code(s): E66.01 - MORBID (SEVERE) OBESITY DUE TO EXCESS CALORIES; Z68.42 - BODY 

MASS INDEX (BMI) 45.0-49.9, ADULT   Status: Chronic   





(10) KEVIN (obstructive sleep apnea)


Code(s): G47.33 - OBSTRUCTIVE SLEEP APNEA (ADULT) (PEDIATRIC)   Status: Chronic

   





- Plan


cont current plan of care, PT/OT


GORDON today, if ok can d/c home, try to get Plavix for home, if unable


-: due to no insurance will send on ASA 325mg daily and have Health


-: [facility] try to arrange the Plavix as outpatient.





* .








- Discharge Day


Encounter end time: 11:25

## 2018-04-03 NOTE — ECHO
TRANSESOPHAGEAL ECHOCARDIOGRAM:

 

DATE OF PROCEDURE:  04/03/18 

 

INDICATION:

This is a 58-year-old woman with a CVA. 

 

DESCRIPTION OF PROCEDURE:

The patient was taken to the PACU. The patient was sedated by

anesthesiology. A transesophageal probe was placed in the distal

esophagus and stomach. Echocardiographic images were obtained. A contrast bubble exam was performed. 
The transesophageal probe was removed.

 

FINDINGS:

1.  Mild left atrial enlargement.

2.  The mitral valve leaflets are thickened with a mild reduction in leaflet excursion.

3.  Mild mitral stenosis.

4.  Mild tricuspid regurgitation.

5.  No thrombus in left atrial appendage.

6.  No PFO by color Doppler or contrast bubble exam.

7.  Atherosclerotic debris in the descending aorta.  

 

IMPRESSION: 

No formed thrombus in left atrial appendage. No PFO was noted

## 2018-04-04 VITALS — TEMPERATURE: 97.6 F | SYSTOLIC BLOOD PRESSURE: 119 MMHG | DIASTOLIC BLOOD PRESSURE: 70 MMHG

## 2018-04-04 LAB
ANION GAP SERPL CALC-SCNC: 14 MMOL/L (ref 10–20)
BASOPHILS # BLD AUTO: 0.1 THOU/UL (ref 0–0.2)
BASOPHILS NFR BLD AUTO: 1.3 % (ref 0–1)
BUN SERPL-MCNC: 20 MG/DL (ref 9.8–20.1)
CALCIUM SERPL-MCNC: 10.1 MG/DL (ref 7.8–10.44)
CHLORIDE SERPL-SCNC: 101 MMOL/L (ref 98–107)
CO2 SERPL-SCNC: 25 MMOL/L (ref 22–29)
CREAT CL PREDICTED SERPL C-G-VRATE: 112 ML/MIN (ref 70–130)
EOSINOPHIL # BLD AUTO: 0.5 THOU/UL (ref 0–0.7)
EOSINOPHIL NFR BLD AUTO: 8 % (ref 0–10)
GLUCOSE SERPL-MCNC: 69 MG/DL (ref 70–105)
HGB BLD-MCNC: 14.9 G/DL (ref 12–16)
LYMPHOCYTES # BLD: 1.6 THOU/UL (ref 1.2–3.4)
LYMPHOCYTES NFR BLD AUTO: 25.9 % (ref 21–51)
MCH RBC QN AUTO: 27.5 PG (ref 27–31)
MCV RBC AUTO: 84.9 FL (ref 81–99)
MONOCYTES # BLD AUTO: 0.5 THOU/UL (ref 0.11–0.59)
MONOCYTES NFR BLD AUTO: 8.2 % (ref 0–10)
NEUTROPHILS # BLD AUTO: 3.6 THOU/UL (ref 1.4–6.5)
NEUTROPHILS NFR BLD AUTO: 56.6 % (ref 42–75)
PLATELET # BLD AUTO: 330 THOU/UL (ref 130–400)
POTASSIUM SERPL-SCNC: 4 MMOL/L (ref 3.5–5.1)
RBC # BLD AUTO: 5.41 MILL/UL (ref 4.2–5.4)
SODIUM SERPL-SCNC: 136 MMOL/L (ref 136–145)
WBC # BLD AUTO: 6.3 THOU/UL (ref 4.8–10.8)

## 2018-04-04 RX ADMIN — BISOPROLOL FUMARATE AND HYDROCHLOROTHIAZIDE SCH TAB: 6.25; 5 TABLET ORAL at 10:05

## 2018-04-04 RX ADMIN — Medication SCH: at 10:07

## 2018-04-04 RX ADMIN — INSULIN HUMAN SCH: 100 INJECTION, SUSPENSION SUBCUTANEOUS at 10:07

## 2018-04-04 NOTE — DIS
DATE OF ADMISSION:  04/01/2018

 

DATE OF DISCHARGE:  04/04/2018

 

ADMITTING DIAGNOSIS:  Transient ischemic attack.

 

DISCHARGE DIAGNOSIS:  Transient ischemic attack.

 

INVESTIGATIONS DURING THIS ADMISSION:

1.  MRI of the brain.

2.  CTA.

 

CONSULTANT INVOLVED IN THE CARE:  Neurology.

 

HOSPITAL COURSE:  This is a 58-year-old morbidly obese white female with known history of hypertensio
n who presented with left-sided weakness with history of stroke about a year ago resulting in the patric
e left-sided weakness.  She has fully recovered from the stroke and she was independent.  She reports
 that she had a sudden onset of left-sided weakness and with difficulty with talking also about the l
eft facial droop.  She immediately called EMS and she was brought to the ER.  On arrival, she had a C
T head without contrast and CT angiogram of the head and neck was unremarkable.  She also had CT perf
usion scan.  So the ER physician called Neurology and discussed the case regarding TPA at this time. 
 The patient was given TPA and she reports improvements in her strength of the left upper and left lo
wer extremity immediately.  She continues to have some problems in her speech and speech therapy was 
consulted.  Patient's LDL was less than 70, so no statins were prescribed on this patient.

 

The patient had good physical therapy and occupational therapy evaluation and patient was allowed to 
go home and her symptoms were completely back to normal.

 

The patient was monitored for a couple of days because of the TPA.

 

PHYSICAL EXAMINATION:

VITAL SIGNS:  On the day of discharge, blood pressure is 119/70, heart rate is 56, respiratory rate o
f 18, saturation 98%.

GENERAL:  The patient is moderately built, moderately nourished, does not appear to be in acute distr
ess.

CARDIOVASCULAR:  S1, S2 normal.  No murmurs, rubs, gallops.

LUNGS:  Bilateral air entry was equal.  No wheezing, no crackles.

ABDOMEN:  Soft, nontender, no guarding, no rebound tenderness.  Bowel sounds normal.

MUSCULOSKELETAL:  No calf tenderness.  No pedal edema.  No joint tenderness, no joint swelling.

SKIN:  No cyanosis or edema.  No rash.

CNS:  Examination II-XII intact.  No focal deficits are noted at this time.

 

DISCHARGE MEDICATIONS:  Amlodipine 10 mg tablet 2 tablets p.o. daily, bisoprolol 2 tablets p.o. daily
, fluoxetine 60 mg p.o. daily, gabapentin 600 mg p.o. b.i.d., insulin, levothyroxine 137 mcg p.o. inocencio
ly, losartan 100 mg p.o. daily, metformin 1 tablet p.o. b.i.d.  The patient is not on statin because 
her LDL is less than 70 and was at goal.

 

The patient was advised to take Plavix instead of aspirin.  So Plavix 75 mg p.o. daily was prescribed
 on the day of discharge.

 

DISCHARGE INSTRUCTIONS:

1.  Continue Accutane.  I strongly advised to follow up with Cardiology.  The patient had a normal TE
E during this admission and we will follow with Cardiology for any further evaluation.

2.  Patient will follow up with Neurology in 2-3 weeks.

3.  The patient was advised to return back to the ER for any worsening symptoms or weakness.

 

I spent 35 minutes.

## 2018-04-07 NOTE — EKG
Test Reason : 

Blood Pressure : ***/*** mmHG

Vent. Rate : 056 BPM     Atrial Rate : 056 BPM

   P-R Int : 256 ms          QRS Dur : 142 ms

    QT Int : 504 ms       P-R-T Axes : 041 270 012 degrees

   QTc Int : 486 ms

 

Sinus bradycardia with 1st degree A-V block

Right bundle branch block

Abnormal ECG

 

Confirmed by JUAN JOSE SIMON ([id number]),  ADRIANA ROA ([id number]) on 4/7/2018 8:41:12 PM

 

Referred By:             Confirmed By:JUAN JOSE SIMON

## 2018-04-09 ENCOUNTER — HOSPITAL ENCOUNTER (EMERGENCY)
Dept: HOSPITAL 92 - ERS | Age: 58
Discharge: HOME | End: 2018-04-09
Payer: SELF-PAY

## 2018-04-09 DIAGNOSIS — N83.202: Primary | ICD-10-CM

## 2018-04-09 DIAGNOSIS — Z79.84: ICD-10-CM

## 2018-04-09 DIAGNOSIS — F17.210: ICD-10-CM

## 2018-04-09 DIAGNOSIS — Z79.82: ICD-10-CM

## 2018-04-09 DIAGNOSIS — G47.30: ICD-10-CM

## 2018-04-09 DIAGNOSIS — E11.9: ICD-10-CM

## 2018-04-09 DIAGNOSIS — I10: ICD-10-CM

## 2018-04-09 DIAGNOSIS — Z86.73: ICD-10-CM

## 2018-04-09 DIAGNOSIS — Z79.899: ICD-10-CM

## 2018-04-09 DIAGNOSIS — E03.9: ICD-10-CM

## 2018-04-09 LAB
ALBUMIN SERPL BCG-MCNC: 4.2 G/DL (ref 3.5–5)
ALP SERPL-CCNC: 82 U/L (ref 40–150)
ALT SERPL W P-5'-P-CCNC: 17 U/L (ref 8–55)
ANION GAP SERPL CALC-SCNC: 16 MMOL/L (ref 10–20)
AST SERPL-CCNC: 18 U/L (ref 5–34)
BASOPHILS # BLD AUTO: 0.1 THOU/UL (ref 0–0.2)
BASOPHILS NFR BLD AUTO: 1.4 % (ref 0–1)
BILIRUB SERPL-MCNC: 0.4 MG/DL (ref 0.2–1.2)
BUN SERPL-MCNC: 19 MG/DL (ref 9.8–20.1)
CALCIUM SERPL-MCNC: 9.7 MG/DL (ref 7.8–10.44)
CHLORIDE SERPL-SCNC: 100 MMOL/L (ref 98–107)
CO2 SERPL-SCNC: 24 MMOL/L (ref 22–29)
CREAT CL PREDICTED SERPL C-G-VRATE: 0 ML/MIN (ref 70–130)
EOSINOPHIL # BLD AUTO: 0.2 THOU/UL (ref 0–0.7)
EOSINOPHIL NFR BLD AUTO: 3.9 % (ref 0–10)
GLOBULIN SER CALC-MCNC: 3.3 G/DL (ref 2.4–3.5)
GLUCOSE SERPL-MCNC: 220 MG/DL (ref 70–105)
HGB BLD-MCNC: 14.4 G/DL (ref 12–16)
LIPASE SERPL-CCNC: 23 U/L (ref 8–78)
LYMPHOCYTES # BLD: 1.2 THOU/UL (ref 1.2–3.4)
LYMPHOCYTES NFR BLD AUTO: 19.7 % (ref 21–51)
MCH RBC QN AUTO: 26.8 PG (ref 27–31)
MCV RBC AUTO: 85.3 FL (ref 81–99)
MONOCYTES # BLD AUTO: 0.4 THOU/UL (ref 0.11–0.59)
MONOCYTES NFR BLD AUTO: 7.2 % (ref 0–10)
NEUTROPHILS # BLD AUTO: 4.1 THOU/UL (ref 1.4–6.5)
NEUTROPHILS NFR BLD AUTO: 68 % (ref 42–75)
PLATELET # BLD AUTO: 296 THOU/UL (ref 130–400)
POTASSIUM SERPL-SCNC: 4.5 MMOL/L (ref 3.5–5.1)
RBC # BLD AUTO: 5.37 MILL/UL (ref 4.2–5.4)
SODIUM SERPL-SCNC: 135 MMOL/L (ref 136–145)
WBC # BLD AUTO: 6.1 THOU/UL (ref 4.8–10.8)

## 2018-04-09 PROCEDURE — 93005 ELECTROCARDIOGRAM TRACING: CPT

## 2018-04-09 PROCEDURE — 74177 CT ABD & PELVIS W/CONTRAST: CPT

## 2018-04-09 PROCEDURE — 80053 COMPREHEN METABOLIC PANEL: CPT

## 2018-04-09 PROCEDURE — 96374 THER/PROPH/DIAG INJ IV PUSH: CPT

## 2018-04-09 PROCEDURE — 85025 COMPLETE CBC W/AUTO DIFF WBC: CPT

## 2018-04-09 PROCEDURE — 96375 TX/PRO/DX INJ NEW DRUG ADDON: CPT

## 2018-04-09 PROCEDURE — 83690 ASSAY OF LIPASE: CPT

## 2018-04-09 PROCEDURE — 96361 HYDRATE IV INFUSION ADD-ON: CPT

## 2018-04-09 NOTE — CT
CT ABDOMEN AND PELVIS WITH CONTRAST:

 

Date:  04/09/18 

 

COMPARISON:  

02/08/17. 

 

HISTORY:  

Sharp left lower quadrant abdominal pain that started 5-6 days ago and is worse with movement. 

 

TECHNIQUE:  

Multiple contiguous axial images were obtained in a CT of the abdomen and pelvis with contrast. Coron
al reformats were performed. 

 

FINDINGS:

There is questionable hyperdensity in the dependent aspect of the gallbladder which could represent g
allstones. No biliary dilatation is seen. There is a stable, 3.2 cm, right adrenal mass. The liver, k
idneys, left adrenal gland, spleen, and pancreas are unremarkable. No free air, free fluid, or strand
ing changes are seen in the abdomen or pelvis. 

 

A dominant follicle is seen in the left ovary measuring 3.0 cm in size. No focal abnormality is other
wise seen in the reproductive organs. 

 

There is scattered diverticula in the colon. The small bowel is unremarkable. The appendix is normal.
 No abdominal or pelvic lymphadenopathy are seen. Atherosclerotic calcifications are seen in the aort
a. 

 

Degenerative changes are seen in the spine. The abdominal wall soft tissues and visualized inferior t
horax are unremarkable. 

 

IMPRESSION: 

1.  No evidence of acute intra-abdominal/pelvic abnormality. 

 

2.  Diverticulosis. 

3.  Possible cholelithiasis. 

4.  Stable right adrenal mass. 

 

 

POS: [facility]

## 2018-05-18 NOTE — EKG
Test Reason : ABD PAIN

Blood Pressure : ***/*** mmHG

Vent. Rate : 062 BPM     Atrial Rate : 062 BPM

   P-R Int : 270 ms          QRS Dur : 130 ms

    QT Int : 454 ms       P-R-T Axes : [phone] degrees

   QTc Int : 460 ms

 

Sinus rhythm with 1st degree A-V block

Right bundle branch block

Inferior infarct , age undetermined

Anteroseptal infarct , age undetermined

Abnormal ECG

When compared with ECG of 30-MAR-2018

No changes

 

 

Confirmed by SOLOMON LUIS, CHUN ([id number]),  ADRIANA ROA ([id number]) on 5/18/2018 3:38:50 PM

 

Referred By:  BURTON           Confirmed By:CHUN CARBAJAL MD

## 2019-08-31 ENCOUNTER — HOSPITAL ENCOUNTER (EMERGENCY)
Dept: HOSPITAL 92 - ERS | Age: 59
LOS: 1 days | Discharge: HOME | End: 2019-09-01
Payer: SELF-PAY

## 2019-08-31 DIAGNOSIS — E11.9: ICD-10-CM

## 2019-08-31 DIAGNOSIS — G47.30: ICD-10-CM

## 2019-08-31 DIAGNOSIS — Z79.84: ICD-10-CM

## 2019-08-31 DIAGNOSIS — E03.9: ICD-10-CM

## 2019-08-31 DIAGNOSIS — R60.0: Primary | ICD-10-CM

## 2019-08-31 DIAGNOSIS — F32.9: ICD-10-CM

## 2019-08-31 DIAGNOSIS — I10: ICD-10-CM

## 2019-08-31 DIAGNOSIS — Z79.899: ICD-10-CM

## 2019-08-31 DIAGNOSIS — Z86.73: ICD-10-CM

## 2019-08-31 DIAGNOSIS — M53.3: ICD-10-CM

## 2019-08-31 PROCEDURE — 72170 X-RAY EXAM OF PELVIS: CPT

## 2019-08-31 PROCEDURE — 96372 THER/PROPH/DIAG INJ SC/IM: CPT

## 2019-09-01 NOTE — ULT
PRELIMINARY REPORT/[facility]/EMERGENCY AFTER

HOURS PROCEDURE: 

 

EXAM:

US Duplex Left Lower Extremity Veins, Limited

 

EXAM DATE/TIME:

9/1/2019 1:37 AM

 

CLINICAL HISTORY:

59 years old, female; Pain; Other: Lt ankle

 

TECHNIQUE:

Imaging protocol: Real-time Duplex ultrasound of the Left Lower Extremity with 2-D gray scale, color 
Doppler flow and spectral waveform analysis with image documentation. Limited exam focused on the lef
t lower extremity veins.

 

COMPARISON:

No relevant prior studies available.

 

FINDINGS:

Left deep veins: Unremarkable. The common femoral, femoral, proximal profunda femoral and popliteal v
eins are patent without thrombus. Normal Doppler waveforms. Normal compressibility and/or augmentatio
n response.

Left superficial veins: Unremarkable. Saphenofemoral junction is patent without thrombus.

Soft tissues: Unremarkable.

 

IMPRESSION:

No acute findings. No evidence of deep vein thrombosis.

 

Thank you for allowing us to participate in the care of your patient.

Dictated and Authenticated by: Tae Cho MD

09/01/2019 2:04 AM Central Time ([place] & [place])

 

 

 

FINAL REPORT 

 

EMERGENCY AFTER HOURS LEFT LOWER EXTREMITY VENOUS DUPLEX ULTRASOUND INCLUDING COLOR AND SPECTRAL DOPP
LER IMAGING:

 

Date:  09/01/19 

Time:  0137 hours

 

FINDINGS/IMPRESSION: 

Exam performed from groin to ankle, including visualized greater saphenous, common femoral, superfici
al femoral, profunda femoral, popliteal, trifurcation, and posterior tibial vein regions. Phasic flow
 is noted throughout. No evidence for deep venous thrombosis. 

 

Report in agreement with preliminary report given on-call by Ileana. 

 

 

POS: [facility]

## 2019-09-01 NOTE — RAD
EXAM:

AP pelvis one view:



HISTORY:

Right groin pain following a fall



COMPARISON:

None



FINDINGS:



Degenerative changes.

No acute fracture or dislocation or other significant acute osseous abnormality.



IMPRESSION:

No significant acute process.



Reported By: Conrad Ponce 

Electronically Signed:  9/1/2019 8:28 AM

## 2020-07-06 ENCOUNTER — HOSPITAL ENCOUNTER (OUTPATIENT)
Dept: HOSPITAL 92 - LABBT | Age: 60
Discharge: HOME | End: 2020-07-06
Attending: OPHTHALMOLOGY
Payer: MEDICARE

## 2020-07-06 DIAGNOSIS — Z01.812: Primary | ICD-10-CM

## 2020-07-06 DIAGNOSIS — H33.41: ICD-10-CM

## 2020-07-06 DIAGNOSIS — Z11.59: ICD-10-CM

## 2020-07-06 PROCEDURE — U0003 INFECTIOUS AGENT DETECTION BY NUCLEIC ACID (DNA OR RNA); SEVERE ACUTE RESPIRATORY SYNDROME CORONAVIRUS 2 (SARS-COV-2) (CORONAVIRUS DISEASE [COVID-19]), AMPLIFIED PROBE TECHNIQUE, MAKING USE OF HIGH THROUGHPUT TECHNOLOGIES AS DESCRIBED BY CMS-2020-01-R: HCPCS

## 2020-07-06 PROCEDURE — 87635 SARS-COV-2 COVID-19 AMP PRB: CPT
